# Patient Record
Sex: FEMALE | URBAN - NONMETROPOLITAN AREA
[De-identification: names, ages, dates, MRNs, and addresses within clinical notes are randomized per-mention and may not be internally consistent; named-entity substitution may affect disease eponyms.]

---

## 2017-08-14 ENCOUNTER — APPOINTMENT (RX ONLY)
Dept: URBAN - NONMETROPOLITAN AREA CLINIC 30 | Facility: CLINIC | Age: 62
Setting detail: DERMATOLOGY
End: 2017-08-14

## 2017-08-14 VITALS — HEART RATE: 89 BPM | DIASTOLIC BLOOD PRESSURE: 77 MMHG | SYSTOLIC BLOOD PRESSURE: 114 MMHG

## 2017-08-14 DIAGNOSIS — Z71.89 OTHER SPECIFIED COUNSELING: ICD-10-CM

## 2017-08-14 DIAGNOSIS — D22 MELANOCYTIC NEVI: ICD-10-CM

## 2017-08-14 DIAGNOSIS — L57.0 ACTINIC KERATOSIS: ICD-10-CM

## 2017-08-14 PROBLEM — L85.3 XEROSIS CUTIS: Status: ACTIVE | Noted: 2017-08-14

## 2017-08-14 PROBLEM — F32.9 MAJOR DEPRESSIVE DISORDER, SINGLE EPISODE, UNSPECIFIED: Status: ACTIVE | Noted: 2017-08-14

## 2017-08-14 PROBLEM — D22.71 MELANOCYTIC NEVI OF RIGHT LOWER LIMB, INCLUDING HIP: Status: ACTIVE | Noted: 2017-08-14

## 2017-08-14 PROBLEM — Z85.3 PERSONAL HISTORY OF MALIGNANT NEOPLASM OF BREAST: Status: ACTIVE | Noted: 2017-08-14

## 2017-08-14 PROBLEM — J30.1 ALLERGIC RHINITIS DUE TO POLLEN: Status: ACTIVE | Noted: 2017-08-14

## 2017-08-14 PROCEDURE — 17000 DESTRUCT PREMALG LESION: CPT

## 2017-08-14 PROCEDURE — ? SUNSCREEN RECOMMENDATIONS

## 2017-08-14 PROCEDURE — ? LIQUID NITROGEN

## 2017-08-14 PROCEDURE — 99201: CPT | Mod: 25

## 2017-08-14 PROCEDURE — ? COUNSELING

## 2017-08-14 ASSESSMENT — LOCATION ZONE DERM
LOCATION ZONE: NOSE
LOCATION ZONE: LEG

## 2017-08-14 ASSESSMENT — LOCATION DETAILED DESCRIPTION DERM
LOCATION DETAILED: RIGHT ANTERIOR PROXIMAL THIGH
LOCATION DETAILED: RIGHT NASAL SIDEWALL

## 2017-08-14 ASSESSMENT — LOCATION SIMPLE DESCRIPTION DERM
LOCATION SIMPLE: RIGHT NOSE
LOCATION SIMPLE: RIGHT THIGH

## 2022-04-19 ENCOUNTER — HOSPITAL ENCOUNTER (OUTPATIENT)
Dept: LAB | Facility: MEDICAL CENTER | Age: 67
End: 2022-04-19
Attending: PHYSICIAN ASSISTANT
Payer: MEDICARE

## 2022-04-19 ENCOUNTER — OFFICE VISIT (OUTPATIENT)
Dept: URGENT CARE | Facility: PHYSICIAN GROUP | Age: 67
End: 2022-04-19
Payer: MEDICARE

## 2022-04-19 ENCOUNTER — HOSPITAL ENCOUNTER (OUTPATIENT)
Dept: RADIOLOGY | Facility: MEDICAL CENTER | Age: 67
End: 2022-04-19
Attending: PHYSICIAN ASSISTANT
Payer: MEDICARE

## 2022-04-19 VITALS
HEIGHT: 68 IN | HEART RATE: 100 BPM | BODY MASS INDEX: 26.67 KG/M2 | RESPIRATION RATE: 16 BRPM | OXYGEN SATURATION: 91 % | TEMPERATURE: 98.5 F | DIASTOLIC BLOOD PRESSURE: 70 MMHG | SYSTOLIC BLOOD PRESSURE: 118 MMHG | WEIGHT: 176 LBS

## 2022-04-19 DIAGNOSIS — J98.6 ELEVATED HEMIDIAPHRAGM: ICD-10-CM

## 2022-04-19 DIAGNOSIS — R06.02 SOB (SHORTNESS OF BREATH): ICD-10-CM

## 2022-04-19 DIAGNOSIS — R10.13 DYSPEPSIA: ICD-10-CM

## 2022-04-19 DIAGNOSIS — R79.89 POSITIVE D-DIMER: ICD-10-CM

## 2022-04-19 LAB
ALBUMIN SERPL BCP-MCNC: 4.2 G/DL (ref 3.2–4.9)
ALBUMIN/GLOB SERPL: 1.3 G/DL
ALP SERPL-CCNC: 128 U/L (ref 30–99)
ALT SERPL-CCNC: 46 U/L (ref 2–50)
ANION GAP SERPL CALC-SCNC: 12 MMOL/L (ref 7–16)
APPEARANCE UR: NORMAL
AST SERPL-CCNC: 58 U/L (ref 12–45)
BASOPHILS # BLD AUTO: 0.4 % (ref 0–1.8)
BASOPHILS # BLD: 0.02 K/UL (ref 0–0.12)
BILIRUB SERPL-MCNC: 0.4 MG/DL (ref 0.1–1.5)
BILIRUB UR STRIP-MCNC: NORMAL MG/DL
BUN SERPL-MCNC: 14 MG/DL (ref 8–22)
CALCIUM SERPL-MCNC: 9.5 MG/DL (ref 8.5–10.5)
CHLORIDE SERPL-SCNC: 101 MMOL/L (ref 96–112)
CO2 SERPL-SCNC: 26 MMOL/L (ref 20–33)
COLOR UR AUTO: NORMAL
CREAT SERPL-MCNC: 0.49 MG/DL (ref 0.5–1.4)
D DIMER PPP IA.FEU-MCNC: 2.52 UG/ML (FEU) (ref 0–0.5)
EOSINOPHIL # BLD AUTO: 0.05 K/UL (ref 0–0.51)
EOSINOPHIL NFR BLD: 0.9 % (ref 0–6.9)
ERYTHROCYTE [DISTWIDTH] IN BLOOD BY AUTOMATED COUNT: 53.2 FL (ref 35.9–50)
FASTING STATUS PATIENT QL REPORTED: NORMAL
GFR SERPLBLD CREATININE-BSD FMLA CKD-EPI: 102 ML/MIN/1.73 M 2
GLOBULIN SER CALC-MCNC: 3.2 G/DL (ref 1.9–3.5)
GLUCOSE SERPL-MCNC: 79 MG/DL (ref 65–99)
GLUCOSE UR STRIP.AUTO-MCNC: NEGATIVE MG/DL
HCT VFR BLD AUTO: 48.9 % (ref 37–47)
HGB BLD-MCNC: 15.7 G/DL (ref 12–16)
IMM GRANULOCYTES # BLD AUTO: 0.01 K/UL (ref 0–0.11)
IMM GRANULOCYTES NFR BLD AUTO: 0.2 % (ref 0–0.9)
KETONES UR STRIP.AUTO-MCNC: NORMAL MG/DL
LEUKOCYTE ESTERASE UR QL STRIP.AUTO: NEGATIVE
LYMPHOCYTES # BLD AUTO: 0.9 K/UL (ref 1–4.8)
LYMPHOCYTES NFR BLD: 16.9 % (ref 22–41)
MCH RBC QN AUTO: 32.9 PG (ref 27–33)
MCHC RBC AUTO-ENTMCNC: 32.1 G/DL (ref 33.6–35)
MCV RBC AUTO: 102.5 FL (ref 81.4–97.8)
MONOCYTES # BLD AUTO: 0.4 K/UL (ref 0–0.85)
MONOCYTES NFR BLD AUTO: 7.5 % (ref 0–13.4)
NEUTROPHILS # BLD AUTO: 3.95 K/UL (ref 2–7.15)
NEUTROPHILS NFR BLD: 74.1 % (ref 44–72)
NITRITE UR QL STRIP.AUTO: NEGATIVE
NRBC # BLD AUTO: 0 K/UL
NRBC BLD-RTO: 0 /100 WBC
PH UR STRIP.AUTO: 5.5 [PH] (ref 5–8)
PLATELET # BLD AUTO: 334 K/UL (ref 164–446)
PMV BLD AUTO: 10.4 FL (ref 9–12.9)
POTASSIUM SERPL-SCNC: 4 MMOL/L (ref 3.6–5.5)
PROT SERPL-MCNC: 7.4 G/DL (ref 6–8.2)
PROT UR QL STRIP: NORMAL MG/DL
RBC # BLD AUTO: 4.77 M/UL (ref 4.2–5.4)
RBC UR QL AUTO: NEGATIVE
SODIUM SERPL-SCNC: 139 MMOL/L (ref 135–145)
SP GR UR STRIP.AUTO: 1.03
UROBILINOGEN UR STRIP-MCNC: 0.2 MG/DL
WBC # BLD AUTO: 5.3 K/UL (ref 4.8–10.8)

## 2022-04-19 PROCEDURE — 85025 COMPLETE CBC W/AUTO DIFF WBC: CPT

## 2022-04-19 PROCEDURE — 71046 X-RAY EXAM CHEST 2 VIEWS: CPT

## 2022-04-19 PROCEDURE — 36415 COLL VENOUS BLD VENIPUNCTURE: CPT

## 2022-04-19 PROCEDURE — 81002 URINALYSIS NONAUTO W/O SCOPE: CPT | Performed by: PHYSICIAN ASSISTANT

## 2022-04-19 PROCEDURE — 85379 FIBRIN DEGRADATION QUANT: CPT

## 2022-04-19 PROCEDURE — 99204 OFFICE O/P NEW MOD 45 MIN: CPT | Performed by: PHYSICIAN ASSISTANT

## 2022-04-19 PROCEDURE — 80053 COMPREHEN METABOLIC PANEL: CPT

## 2022-04-19 RX ORDER — OMEPRAZOLE 20 MG/1
20 CAPSULE, DELAYED RELEASE ORAL DAILY
COMMUNITY

## 2022-04-19 RX ORDER — MELOXICAM 7.5 MG/1
TABLET ORAL
COMMUNITY

## 2022-04-19 ASSESSMENT — ENCOUNTER SYMPTOMS
SORE THROAT: 0
VOMITING: 0
STRIDOR: 0
FEVER: 0
SINUS PAIN: 0
WHEEZING: 0
DIZZINESS: 0
SPUTUM PRODUCTION: 0
MYALGIAS: 0
COUGH: 0
DIAPHORESIS: 0
NAUSEA: 0
SHORTNESS OF BREATH: 1
DIARRHEA: 0
HEADACHES: 0
PALPITATIONS: 0
CHILLS: 0
ORTHOPNEA: 0
ABDOMINAL PAIN: 0
HEARTBURN: 1

## 2022-04-19 NOTE — PROGRESS NOTES
Subjective:     CHIEF COMPLAINT  Chief Complaint   Patient presents with   • Abdominal Pain     Pt states she has been having indigestion problems for about a month and was seen with her PCP and was told that they were concern of a gallbladder and states that she has been feeling really short of breath x 1 week       HPI  Dominga MARCANO is a 67 y.o. female who presents to the clinic with progressively worsening shortness of breath x1 to 2 weeks.  Patient has a past medical history significant for breast cancer in remission, tobacco use in which she quit in 2014 and GERD.  Patient lives in [place].  She is visiting from out of town to help her daughter move.  States she is becoming short of breath with minimal exertion.  Occasionally has a mild cough.  States she does not feel ill.  Denies any chest pain or pressure.  She does occasionally have pain pain to the right shoulder.  Denies any acute trauma or injury.  She is also burping more frequently.  Feels bloated to her abdominal region.  History of GERD for which she used to take Prilosec however is not been taking this recently.  Few records are available on the patient however she was seen by her PCP in [place] on 4/6/2022.  At that time she had an abdominal x-ray performed that was within normal limits.  Blood work was within normal limits.  Her chest x-ray demonstrated a small right-sided pleural effusion with no known etiology.  She denies any history of DVT or PE.  No history of cardiopulmonary disease.    REVIEW OF SYSTEMS  Review of Systems   Constitutional: Negative for chills, diaphoresis, fever and malaise/fatigue.   HENT: Negative for congestion, ear pain, sinus pain and sore throat.    Respiratory: Positive for shortness of breath. Negative for cough, sputum production, wheezing and stridor.    Cardiovascular: Negative for chest pain, palpitations, orthopnea and leg swelling.   Gastrointestinal: Positive for heartburn. Negative for abdominal pain,  "diarrhea, nausea and vomiting.   Musculoskeletal: Negative for myalgias.   Neurological: Negative for dizziness and headaches.       PAST MEDICAL HISTORY  There are no problems to display for this patient.      SURGICAL HISTORY  patient denies any surgical history    ALLERGIES  No Known Allergies    CURRENT MEDICATIONS  Home Medications     Reviewed by Honorio Singh P.A.-C. (Physician Assistant) on 04/19/22 at 0908  Med List Status: <None>   Medication Last Dose Status   meloxicam (MOBIC) 7.5 MG Tab PRN Active   omeprazole (PRILOSEC) 20 MG delayed-release capsule Taking Active                SOCIAL HISTORY  Social History     Tobacco Use   • Smoking status: Former Smoker   • Smokeless tobacco: Never Used   Substance and Sexual Activity   • Alcohol use: Not on file   • Drug use: Not on file   • Sexual activity: Not on file       FAMILY HISTORY  History reviewed. No pertinent family history.       Objective:     VITAL SIGNS: /70 (BP Location: Left arm, Patient Position: Sitting, BP Cuff Size: Large adult)   Pulse 100   Temp 36.9 °C (98.5 °F) (Temporal)   Resp 16   Ht 1.727 m (5' 8\")   Wt 79.8 kg (176 lb)   SpO2 91%   BMI 26.76 kg/m²     PHYSICAL EXAM  Physical Exam  Constitutional:       General: She is not in acute distress.     Appearance: Normal appearance. She is not ill-appearing, toxic-appearing or diaphoretic.   HENT:      Head: Normocephalic and atraumatic.      Right Ear: Tympanic membrane, ear canal and external ear normal.      Left Ear: Tympanic membrane, ear canal and external ear normal.      Nose: Nose normal. No congestion or rhinorrhea.      Mouth/Throat:      Mouth: Mucous membranes are moist.      Pharynx: No oropharyngeal exudate or posterior oropharyngeal erythema.   Eyes:      Conjunctiva/sclera: Conjunctivae normal.   Cardiovascular:      Rate and Rhythm: Normal rate and regular rhythm.      Pulses: Normal pulses.      Heart sounds: Normal heart sounds.   Pulmonary:      Effort: " Pulmonary effort is normal.      Breath sounds: Normal breath sounds. No wheezing.      Comments: Slightly decreased lung sounds to the right lower lobe.  No wheezes, rhonchi or rales.  Patient appears short of breath when speaking in full sentences.  Abdominal:      General: Bowel sounds are normal.      Tenderness: There is no abdominal tenderness. There is no guarding or rebound.      Comments: Patient has no abdominal tenderness to palpation.  No rebound, rigidity or guarding.  Negative Escobedo sign.   Musculoskeletal:      Cervical back: Normal range of motion. No muscular tenderness.   Lymphadenopathy:      Cervical: No cervical adenopathy.   Skin:     General: Skin is warm and dry.      Capillary Refill: Capillary refill takes less than 2 seconds.   Neurological:      Mental Status: She is alert.   Psychiatric:         Mood and Affect: Mood normal.         Thought Content: Thought content normal.       Lab Results/POC Test Results   Results for orders placed or performed in visit on 04/19/22   POCT Urinalysis   Result Value Ref Range    POC Color Dark Yellow Negative    POC Appearance Cloudy Negative    POC Leukocyte Esterase Negative Negative    POC Nitrites NEgative Negative    POC Urobiligen 0.2 Negative (0.2) mg/dL    POC Protein Trace Negative mg/dL    POC Urine PH 5.5 5.0 - 8.0    POC Blood Negative Negative    POC Specific Gravity 1.030 <1.005 - >1.030    POC Ketones Trace Negative mg/dL    POC Bilirubin Small Negative mg/dL    POC Glucose Negative Negative mg/dL         RADIOLOGY RESULTS   DX-CHEST-2 VIEWS    Result Date: 4/19/2022 4/19/2022 9:42 AM HISTORY/REASON FOR EXAM:  Shortness of Breath TECHNIQUE/EXAM DESCRIPTION AND NUMBER OF VIEWS: Two views of the chest. COMPARISON:  None. FINDINGS: Mild elevation of the right hemidiaphragm. No pulmonary infiltrates or consolidations are noted. No pleural effusions, no pneumothorax are appreciated. Normal cardiopericardial silhouette.     1. No active  cardiopulmonary abnormalities are identified.           Assessment/Plan:     1. SOB (shortness of breath)    - DX-CHEST-2 VIEWS; Future  - CBC WITH DIFFERENTIAL; Future  - Comp Metabolic Panel; Future  - D-DIMER; Future    2. Dyspepsia    - POCT Urinalysis  - CBC WITH DIFFERENTIAL; Future  - Comp Metabolic Panel; Future    3. Elevated hemidiaphragm    - D-DIMER; Future      MDM/Comments:    Patient presenting with shortness of breath x1 to 2 weeks.  On exam lung sounds are slightly decreased to the right lower quadrant.  Chest x-ray was performed that demonstrated a mildly elevated right hemidiaphragm.  No signs concerning for effusion, consolidation or pneumonia appreciated.  Patient's vital signs are stable and reassuring.  Recently traveled from [place] and is in town for 1 week.  Denies any prior history of cardiopulmonary disease.  Wells score: 0.  No lower extremity edema.  Patient states she does not feel ill.  No tenderness to palpation over the right upper quadrant.  At this time we will perform outpatient lab work and I will follow-up once this is available.  Strict ER precautions for any red flag symptoms.    Differential diagnosis, natural history, supportive care, and indications for immediate follow-up discussed. All questions answered. Patient agrees with the plan of care.    Follow-up as needed if symptoms worsen or fail to improve to PCP, Urgent care or Emergency Room.    I have personally reviewed prior external notes and test results pertinent to today's visit.  I have independently reviewed and interpreted all diagnostics ordered during this urgent care acute visit.   Discussed management options (risks,benefits, and alternatives to treatment). Pt expresses understanding and the treatment plan was agreed upon. Questions were encouraged and answered to pt's satisfaction.    Please note that this dictation was created using voice recognition software. I have made a reasonable attempt to correct  obvious errors, but I expect that there are errors of grammar and possibly content that I did not discover before finalizing the note.

## 2022-04-20 ENCOUNTER — TELEPHONE (OUTPATIENT)
Dept: URGENT CARE | Facility: PHYSICIAN GROUP | Age: 67
End: 2022-04-20

## 2022-04-21 ENCOUNTER — APPOINTMENT (OUTPATIENT)
Dept: RADIOLOGY | Facility: MEDICAL CENTER | Age: 67
End: 2022-04-21
Payer: MEDICARE

## 2022-04-21 ENCOUNTER — HOSPITAL ENCOUNTER (OUTPATIENT)
Facility: MEDICAL CENTER | Age: 67
End: 2022-04-22
Attending: STUDENT IN AN ORGANIZED HEALTH CARE EDUCATION/TRAINING PROGRAM | Admitting: INTERNAL MEDICINE
Payer: MEDICARE

## 2022-04-21 ENCOUNTER — APPOINTMENT (OUTPATIENT)
Dept: RADIOLOGY | Facility: MEDICAL CENTER | Age: 67
End: 2022-04-21
Attending: STUDENT IN AN ORGANIZED HEALTH CARE EDUCATION/TRAINING PROGRAM
Payer: MEDICARE

## 2022-04-21 ENCOUNTER — PATIENT MESSAGE (OUTPATIENT)
Dept: URGENT CARE | Facility: PHYSICIAN GROUP | Age: 67
End: 2022-04-21
Payer: MEDICARE

## 2022-04-21 DIAGNOSIS — R09.02 HYPOXIA: ICD-10-CM

## 2022-04-21 DIAGNOSIS — J90 PLEURAL EFFUSION ON RIGHT: ICD-10-CM

## 2022-04-21 DIAGNOSIS — R06.09 DYSPNEA ON EXERTION: ICD-10-CM

## 2022-04-21 LAB
ALBUMIN SERPL BCP-MCNC: 4.2 G/DL (ref 3.2–4.9)
ALBUMIN/GLOB SERPL: 1.2 G/DL
ALP SERPL-CCNC: 143 U/L (ref 30–99)
ALT SERPL-CCNC: 40 U/L (ref 2–50)
ANION GAP SERPL CALC-SCNC: 12 MMOL/L (ref 7–16)
AST SERPL-CCNC: 53 U/L (ref 12–45)
BASOPHILS # BLD AUTO: 0.5 % (ref 0–1.8)
BASOPHILS # BLD: 0.03 K/UL (ref 0–0.12)
BILIRUB SERPL-MCNC: 0.5 MG/DL (ref 0.1–1.5)
BUN SERPL-MCNC: 11 MG/DL (ref 8–22)
CALCIUM SERPL-MCNC: 9.6 MG/DL (ref 8.4–10.2)
CHLORIDE SERPL-SCNC: 98 MMOL/L (ref 96–112)
CO2 SERPL-SCNC: 26 MMOL/L (ref 20–33)
CREAT SERPL-MCNC: 0.53 MG/DL (ref 0.5–1.4)
EKG IMPRESSION: NORMAL
EOSINOPHIL # BLD AUTO: 0.06 K/UL (ref 0–0.51)
EOSINOPHIL NFR BLD: 1 % (ref 0–6.9)
ERYTHROCYTE [DISTWIDTH] IN BLOOD BY AUTOMATED COUNT: 49.1 FL (ref 35.9–50)
GFR SERPLBLD CREATININE-BSD FMLA CKD-EPI: 101 ML/MIN/1.73 M 2
GLOBULIN SER CALC-MCNC: 3.6 G/DL (ref 1.9–3.5)
GLUCOSE SERPL-MCNC: 98 MG/DL (ref 65–99)
HCT VFR BLD AUTO: 49.3 % (ref 37–47)
HGB BLD-MCNC: 16.2 G/DL (ref 12–16)
IMM GRANULOCYTES # BLD AUTO: 0.03 K/UL (ref 0–0.11)
IMM GRANULOCYTES NFR BLD AUTO: 0.5 % (ref 0–0.9)
LYMPHOCYTES # BLD AUTO: 1.24 K/UL (ref 1–4.8)
LYMPHOCYTES NFR BLD: 20.9 % (ref 22–41)
MCH RBC QN AUTO: 32.4 PG (ref 27–33)
MCHC RBC AUTO-ENTMCNC: 32.9 G/DL (ref 33.6–35)
MCV RBC AUTO: 98.6 FL (ref 81.4–97.8)
MONOCYTES # BLD AUTO: 0.55 K/UL (ref 0–0.85)
MONOCYTES NFR BLD AUTO: 9.3 % (ref 0–13.4)
NEUTROPHILS # BLD AUTO: 4.01 K/UL (ref 2–7.15)
NEUTROPHILS NFR BLD: 67.8 % (ref 44–72)
NRBC # BLD AUTO: 0 K/UL
NRBC BLD-RTO: 0 /100 WBC
PLATELET # BLD AUTO: 322 K/UL (ref 164–446)
PMV BLD AUTO: 9.6 FL (ref 9–12.9)
POTASSIUM SERPL-SCNC: 3.7 MMOL/L (ref 3.6–5.5)
PROT SERPL-MCNC: 7.8 G/DL (ref 6–8.2)
RBC # BLD AUTO: 5 M/UL (ref 4.2–5.4)
SODIUM SERPL-SCNC: 136 MMOL/L (ref 135–145)
TROPONIN T SERPL-MCNC: 6 NG/L (ref 6–19)
WBC # BLD AUTO: 5.9 K/UL (ref 4.8–10.8)

## 2022-04-21 PROCEDURE — 85025 COMPLETE CBC W/AUTO DIFF WBC: CPT

## 2022-04-21 PROCEDURE — 84484 ASSAY OF TROPONIN QUANT: CPT

## 2022-04-21 PROCEDURE — 80053 COMPREHEN METABOLIC PANEL: CPT

## 2022-04-21 PROCEDURE — 99285 EMERGENCY DEPT VISIT HI MDM: CPT

## 2022-04-21 PROCEDURE — 700117 HCHG RX CONTRAST REV CODE 255: Performed by: STUDENT IN AN ORGANIZED HEALTH CARE EDUCATION/TRAINING PROGRAM

## 2022-04-21 PROCEDURE — G0378 HOSPITAL OBSERVATION PER HR: HCPCS

## 2022-04-21 PROCEDURE — 71275 CT ANGIOGRAPHY CHEST: CPT | Mod: ME

## 2022-04-21 PROCEDURE — 93005 ELECTROCARDIOGRAM TRACING: CPT | Performed by: STUDENT IN AN ORGANIZED HEALTH CARE EDUCATION/TRAINING PROGRAM

## 2022-04-21 PROCEDURE — 99220 PR INITIAL OBSERVATION CARE,LEVL III: CPT | Performed by: INTERNAL MEDICINE

## 2022-04-21 PROCEDURE — 93005 ELECTROCARDIOGRAM TRACING: CPT

## 2022-04-21 PROCEDURE — 36415 COLL VENOUS BLD VENIPUNCTURE: CPT

## 2022-04-21 RX ORDER — OXYCODONE HYDROCHLORIDE 5 MG/1
5 TABLET ORAL
Status: DISCONTINUED | OUTPATIENT
Start: 2022-04-21 | End: 2022-04-22 | Stop reason: HOSPADM

## 2022-04-21 RX ORDER — POTASSIUM CHLORIDE 20 MEQ/1
20 TABLET, EXTENDED RELEASE ORAL 2 TIMES DAILY
Status: DISCONTINUED | OUTPATIENT
Start: 2022-04-22 | End: 2022-04-22 | Stop reason: HOSPADM

## 2022-04-21 RX ORDER — BISACODYL 10 MG
10 SUPPOSITORY, RECTAL RECTAL
Status: DISCONTINUED | OUTPATIENT
Start: 2022-04-21 | End: 2022-04-22 | Stop reason: HOSPADM

## 2022-04-21 RX ORDER — AMOXICILLIN 250 MG
2 CAPSULE ORAL 2 TIMES DAILY
Status: DISCONTINUED | OUTPATIENT
Start: 2022-04-22 | End: 2022-04-22 | Stop reason: HOSPADM

## 2022-04-21 RX ORDER — ONDANSETRON 4 MG/1
4 TABLET, ORALLY DISINTEGRATING ORAL EVERY 4 HOURS PRN
Status: DISCONTINUED | OUTPATIENT
Start: 2022-04-21 | End: 2022-04-22 | Stop reason: HOSPADM

## 2022-04-21 RX ORDER — CYCLOBENZAPRINE HCL 10 MG
10 TABLET ORAL 3 TIMES DAILY PRN
Status: DISCONTINUED | OUTPATIENT
Start: 2022-04-21 | End: 2022-04-22 | Stop reason: HOSPADM

## 2022-04-21 RX ORDER — ONDANSETRON 2 MG/ML
4 INJECTION INTRAMUSCULAR; INTRAVENOUS EVERY 4 HOURS PRN
Status: DISCONTINUED | OUTPATIENT
Start: 2022-04-21 | End: 2022-04-22 | Stop reason: HOSPADM

## 2022-04-21 RX ORDER — MORPHINE SULFATE 4 MG/ML
2 INJECTION INTRAVENOUS
Status: DISCONTINUED | OUTPATIENT
Start: 2022-04-21 | End: 2022-04-22 | Stop reason: HOSPADM

## 2022-04-21 RX ORDER — POLYETHYLENE GLYCOL 3350 17 G/17G
1 POWDER, FOR SOLUTION ORAL
Status: DISCONTINUED | OUTPATIENT
Start: 2022-04-21 | End: 2022-04-22 | Stop reason: HOSPADM

## 2022-04-21 RX ORDER — IBUPROFEN 600 MG/1
600 TABLET ORAL EVERY 6 HOURS PRN
Status: DISCONTINUED | OUTPATIENT
Start: 2022-04-21 | End: 2022-04-22 | Stop reason: HOSPADM

## 2022-04-21 RX ORDER — HEPARIN SODIUM 5000 [USP'U]/ML
5000 INJECTION, SOLUTION INTRAVENOUS; SUBCUTANEOUS EVERY 8 HOURS
Status: DISCONTINUED | OUTPATIENT
Start: 2022-04-22 | End: 2022-04-21

## 2022-04-21 RX ORDER — ACETAMINOPHEN 325 MG/1
650 TABLET ORAL EVERY 6 HOURS PRN
Status: DISCONTINUED | OUTPATIENT
Start: 2022-04-21 | End: 2022-04-22 | Stop reason: HOSPADM

## 2022-04-21 RX ORDER — LABETALOL HYDROCHLORIDE 5 MG/ML
10 INJECTION, SOLUTION INTRAVENOUS EVERY 4 HOURS PRN
Status: DISCONTINUED | OUTPATIENT
Start: 2022-04-21 | End: 2022-04-22 | Stop reason: HOSPADM

## 2022-04-21 RX ORDER — OXYCODONE HYDROCHLORIDE 5 MG/1
2.5 TABLET ORAL
Status: DISCONTINUED | OUTPATIENT
Start: 2022-04-21 | End: 2022-04-22 | Stop reason: HOSPADM

## 2022-04-21 RX ORDER — FUROSEMIDE 10 MG/ML
20 INJECTION INTRAMUSCULAR; INTRAVENOUS
Status: DISCONTINUED | OUTPATIENT
Start: 2022-04-22 | End: 2022-04-22 | Stop reason: HOSPADM

## 2022-04-21 RX ORDER — OMEPRAZOLE 20 MG/1
20 CAPSULE, DELAYED RELEASE ORAL DAILY
Status: DISCONTINUED | OUTPATIENT
Start: 2022-04-22 | End: 2022-04-22 | Stop reason: HOSPADM

## 2022-04-21 RX ADMIN — IOHEXOL 56 ML: 350 INJECTION, SOLUTION INTRAVENOUS at 21:42

## 2022-04-21 ASSESSMENT — ENCOUNTER SYMPTOMS
DIARRHEA: 0
STRIDOR: 0
CHILLS: 0
ABDOMINAL PAIN: 0
NAUSEA: 0
TINGLING: 0
VOMITING: 0
PALPITATIONS: 0
SHORTNESS OF BREATH: 1
COUGH: 1
LOSS OF CONSCIOUSNESS: 0
FALLS: 0
WEAKNESS: 0
MYALGIAS: 0
FEVER: 0
BACK PAIN: 1
DIZZINESS: 0
CONSTIPATION: 0
DEPRESSION: 0
HEADACHES: 0
SPUTUM PRODUCTION: 0

## 2022-04-21 ASSESSMENT — FIBROSIS 4 INDEX: FIB4 SCORE: 1.74

## 2022-04-22 ENCOUNTER — APPOINTMENT (OUTPATIENT)
Dept: RADIOLOGY | Facility: MEDICAL CENTER | Age: 67
End: 2022-04-22
Attending: INTERNAL MEDICINE
Payer: MEDICARE

## 2022-04-22 ENCOUNTER — HOSPITAL ENCOUNTER (OUTPATIENT)
Dept: CARDIOLOGY | Facility: MEDICAL CENTER | Age: 67
End: 2022-04-22
Attending: INTERNAL MEDICINE | Admitting: INTERNAL MEDICINE
Payer: MEDICARE

## 2022-04-22 VITALS
HEIGHT: 68 IN | HEART RATE: 86 BPM | OXYGEN SATURATION: 90 % | TEMPERATURE: 98 F | RESPIRATION RATE: 16 BRPM | WEIGHT: 178.57 LBS | DIASTOLIC BLOOD PRESSURE: 70 MMHG | BODY MASS INDEX: 27.06 KG/M2 | SYSTOLIC BLOOD PRESSURE: 120 MMHG

## 2022-04-22 PROBLEM — M54.9 BACK PAIN: Status: ACTIVE | Noted: 2022-04-22

## 2022-04-22 PROBLEM — K76.9 LIVER LESION: Status: ACTIVE | Noted: 2022-04-22

## 2022-04-22 PROBLEM — R09.02 HYPOXIA: Status: ACTIVE | Noted: 2022-04-22

## 2022-04-22 PROBLEM — R79.89 ELEVATED D-DIMER: Status: ACTIVE | Noted: 2022-04-22

## 2022-04-22 LAB
ANION GAP SERPL CALC-SCNC: 10 MMOL/L (ref 7–16)
APPEARANCE FLD: NORMAL
APTT PPP: 25.5 SEC (ref 24.7–36)
BODY FLD TYPE: NORMAL
BODY FLD TYPE: NORMAL
BUN SERPL-MCNC: 10 MG/DL (ref 8–22)
CALCIUM SERPL-MCNC: 8.9 MG/DL (ref 8.4–10.2)
CHLORIDE SERPL-SCNC: 101 MMOL/L (ref 96–112)
CO2 SERPL-SCNC: 26 MMOL/L (ref 20–33)
COLOR FLD: YELLOW
CREAT SERPL-MCNC: 0.46 MG/DL (ref 0.5–1.4)
CSF COMMENTS 1658: NORMAL
CYTOLOGY REG CYTOL: NORMAL
ERYTHROCYTE [DISTWIDTH] IN BLOOD BY AUTOMATED COUNT: 49.7 FL (ref 35.9–50)
GFR SERPLBLD CREATININE-BSD FMLA CKD-EPI: 104 ML/MIN/1.73 M 2
GLUCOSE FLD-MCNC: 98 MG/DL
GLUCOSE SERPL-MCNC: 107 MG/DL (ref 65–99)
HCT VFR BLD AUTO: 43.4 % (ref 37–47)
HGB BLD-MCNC: 14.3 G/DL (ref 12–16)
INR PPP: 0.91 (ref 0.87–1.13)
LDH FLD L TO P-CCNC: 152 U/L
LYMPHOCYTES NFR FLD: 80 %
MCH RBC QN AUTO: 33 PG (ref 27–33)
MCHC RBC AUTO-ENTMCNC: 32.9 G/DL (ref 33.6–35)
MCV RBC AUTO: 100.2 FL (ref 81.4–97.8)
MESOTHL CELL NFR FLD: 5 %
MONONUC CELLS NFR FLD: 14 %
NEUTROPHILS NFR FLD: 1 %
PH FLD: 8 [PH]
PLATELET # BLD AUTO: 275 K/UL (ref 164–446)
PMV BLD AUTO: 9.6 FL (ref 9–12.9)
POTASSIUM SERPL-SCNC: 3.7 MMOL/L (ref 3.6–5.5)
PROT FLD-MCNC: 4.2 G/DL
PROTHROMBIN TIME: 11.6 SEC (ref 12–14.6)
RBC # BLD AUTO: 4.33 M/UL (ref 4.2–5.4)
RBC # FLD: 5000 CELLS/UL
SODIUM SERPL-SCNC: 137 MMOL/L (ref 135–145)
WBC # BLD AUTO: 4.9 K/UL (ref 4.8–10.8)
WBC # FLD: 2595 CELLS/UL

## 2022-04-22 PROCEDURE — 80048 BASIC METABOLIC PNL TOTAL CA: CPT

## 2022-04-22 PROCEDURE — 85730 THROMBOPLASTIN TIME PARTIAL: CPT

## 2022-04-22 PROCEDURE — 700111 HCHG RX REV CODE 636 W/ 250 OVERRIDE (IP): Performed by: INTERNAL MEDICINE

## 2022-04-22 PROCEDURE — 82150 ASSAY OF AMYLASE: CPT

## 2022-04-22 PROCEDURE — A9270 NON-COVERED ITEM OR SERVICE: HCPCS | Performed by: INTERNAL MEDICINE

## 2022-04-22 PROCEDURE — 94760 N-INVAS EAR/PLS OXIMETRY 1: CPT

## 2022-04-22 PROCEDURE — 88112 CYTOPATH CELL ENHANCE TECH: CPT

## 2022-04-22 PROCEDURE — 83615 LACTATE (LD) (LDH) ENZYME: CPT

## 2022-04-22 PROCEDURE — 88342 IMHCHEM/IMCYTCHM 1ST ANTB: CPT

## 2022-04-22 PROCEDURE — G0378 HOSPITAL OBSERVATION PER HR: HCPCS

## 2022-04-22 PROCEDURE — 83986 ASSAY PH BODY FLUID NOS: CPT

## 2022-04-22 PROCEDURE — 84157 ASSAY OF PROTEIN OTHER: CPT

## 2022-04-22 PROCEDURE — 71045 X-RAY EXAM CHEST 1 VIEW: CPT

## 2022-04-22 PROCEDURE — 88305 TISSUE EXAM BY PATHOLOGIST: CPT

## 2022-04-22 PROCEDURE — 87205 SMEAR GRAM STAIN: CPT | Mod: 91

## 2022-04-22 PROCEDURE — 700102 HCHG RX REV CODE 250 W/ 637 OVERRIDE(OP): Performed by: INTERNAL MEDICINE

## 2022-04-22 PROCEDURE — 700101 HCHG RX REV CODE 250: Performed by: INTERNAL MEDICINE

## 2022-04-22 PROCEDURE — 87116 MYCOBACTERIA CULTURE: CPT

## 2022-04-22 PROCEDURE — 96374 THER/PROPH/DIAG INJ IV PUSH: CPT

## 2022-04-22 PROCEDURE — 32554 ASPIRATE PLEURA W/O IMAGING: CPT

## 2022-04-22 PROCEDURE — 99217 PR OBSERVATION CARE DISCHARGE: CPT | Performed by: HOSPITALIST

## 2022-04-22 PROCEDURE — 82945 GLUCOSE OTHER FLUID: CPT

## 2022-04-22 PROCEDURE — 85610 PROTHROMBIN TIME: CPT

## 2022-04-22 PROCEDURE — 85027 COMPLETE CBC AUTOMATED: CPT

## 2022-04-22 PROCEDURE — 32555 ASPIRATE PLEURA W/ IMAGING: CPT | Mod: RT | Performed by: INTERNAL MEDICINE

## 2022-04-22 PROCEDURE — 87102 FUNGUS ISOLATION CULTURE: CPT

## 2022-04-22 PROCEDURE — 87070 CULTURE OTHR SPECIMN AEROBIC: CPT

## 2022-04-22 PROCEDURE — 89051 BODY FLUID CELL COUNT: CPT

## 2022-04-22 PROCEDURE — 88341 IMHCHEM/IMCYTCHM EA ADD ANTB: CPT | Mod: 91

## 2022-04-22 RX ORDER — LIDOCAINE HYDROCHLORIDE 20 MG/ML
20 INJECTION, SOLUTION INFILTRATION; PERINEURAL ONCE
Status: COMPLETED | OUTPATIENT
Start: 2022-04-22 | End: 2022-04-22

## 2022-04-22 RX ADMIN — LIDOCAINE HYDROCHLORIDE 20 ML: 20 INJECTION, SOLUTION INFILTRATION; PERINEURAL at 12:00

## 2022-04-22 RX ADMIN — SENNOSIDES AND DOCUSATE SODIUM 2 TABLET: 50; 8.6 TABLET ORAL at 05:21

## 2022-04-22 RX ADMIN — OMEPRAZOLE 20 MG: 20 CAPSULE, DELAYED RELEASE ORAL at 05:22

## 2022-04-22 RX ADMIN — FUROSEMIDE 20 MG: 10 INJECTION, SOLUTION INTRAMUSCULAR; INTRAVENOUS at 05:21

## 2022-04-22 RX ADMIN — ACETAMINOPHEN 650 MG: 325 TABLET, FILM COATED ORAL at 13:23

## 2022-04-22 RX ADMIN — POTASSIUM CHLORIDE 20 MEQ: 20 TABLET, EXTENDED RELEASE ORAL at 05:22

## 2022-04-22 RX ADMIN — IBUPROFEN 600 MG: 600 TABLET ORAL at 13:23

## 2022-04-22 ASSESSMENT — LIFESTYLE VARIABLES
TOTAL SCORE: 0
HOW MANY TIMES IN THE PAST YEAR HAVE YOU HAD 5 OR MORE DRINKS IN A DAY: 0
HAVE PEOPLE ANNOYED YOU BY CRITICIZING YOUR DRINKING: NO
EVER FELT BAD OR GUILTY ABOUT YOUR DRINKING: NO
CONSUMPTION TOTAL: NEGATIVE
AVERAGE NUMBER OF DAYS PER WEEK YOU HAVE A DRINK CONTAINING ALCOHOL: 6
ON A TYPICAL DAY WHEN YOU DRINK ALCOHOL HOW MANY DRINKS DO YOU HAVE: 1
ALCOHOL_USE: YES
TOTAL SCORE: 0
EVER HAD A DRINK FIRST THING IN THE MORNING TO STEADY YOUR NERVES TO GET RID OF A HANGOVER: NO
TOTAL SCORE: 0
HAVE YOU EVER FELT YOU SHOULD CUT DOWN ON YOUR DRINKING: NO

## 2022-04-22 ASSESSMENT — COGNITIVE AND FUNCTIONAL STATUS - GENERAL
DAILY ACTIVITIY SCORE: 24
SUGGESTED CMS G CODE MODIFIER MOBILITY: CH
MOBILITY SCORE: 24
SUGGESTED CMS G CODE MODIFIER DAILY ACTIVITY: CH

## 2022-04-22 ASSESSMENT — FIBROSIS 4 INDEX: FIB4 SCORE: 2.07

## 2022-04-22 ASSESSMENT — PATIENT HEALTH QUESTIONNAIRE - PHQ9
2. FEELING DOWN, DEPRESSED, IRRITABLE, OR HOPELESS: NOT AT ALL
SUM OF ALL RESPONSES TO PHQ9 QUESTIONS 1 AND 2: 0
1. LITTLE INTEREST OR PLEASURE IN DOING THINGS: NOT AT ALL

## 2022-04-22 NOTE — DISCHARGE SUMMARY
Discharge Summary    CHIEF COMPLAINT ON ADMISSION  Chief Complaint   Patient presents with   • Shortness of Breath   • Shoulder Pain     Patient report of shortness of breath for approximately 1.5 week progressively getting worst. Right shoulder and right upper back pain for the past 1 month.  Patient had blood drawn and her D-Dimer was elevated, she was sent for further evaluation for possible PE study.        Reason for Admission  Abnormal Labs     Admission Date  4/21/2022    CODE STATUS  Full Code    HPI & HOSPITAL COURSE  Ms. Dominag Morgan is a 67-year-old female who has a past medical history of breast cancer on the right side status post surgical resection and chemotherapy in 2014.  Now came from [place] to visit her daughter.  He had the patient became more short of breath and started to experience some back pain as well.  She says the situation already began back in [place] and her primary care physician was aware of it and ordered some initial imaging studies including a chest x-ray as well as an ultrasound and some lab work.  However since the situation became worse the patient thus came to the emergency room for evaluation.  Here initial chest x-ray shows mild effusion but then the patient underwent a CAT scan which shows significant effusion of the right lung.  Minimal effusion on the left lung.  The patient also has some abnormalities on her liver.  The patient has a longstanding history of tobacco use and certainly is at high risk for possible lung malignancy.  This at this point cannot be excluded but is suspected.  After discussion with my partner on the time of admission the patient decided she would does not want a full work-up here she would like to go back to [place] to complete her work-up but would like the pleural effusion addressed so that she has an easier time breathing.  I sat with her and the daughter at bedside today and extensively discussed the plan of treatment that is necessary for a  complete work-up and possible treatment.  The patient and at this point elected to call her primary care physician in [place] and at this point is beginning her work-up down there starting next week on Thursday when she returns to [place].  In the meantime we have drained her pleural effusion here.  780 mL of fluid were removed.  Post procedure chest x-ray was performed.  Patient's pain currently is controlled and after the procedure the patient is breathing better.  Patient at this point can discharge home and complete outpatient work-up for her situation.    Therefore, she is discharged in good and stable condition to home with close outpatient follow-up.    The patient recovered much more quickly than anticipated on admission.    Discharge Date  4/22/2022    FOLLOW UP ITEMS POST DISCHARGE  Follow-up with the primary care physician in [place] next week    DISCHARGE DIAGNOSES  Principal Problem:    Bilateral pleural effusion POA: Yes  Active Problems:    Liver lesion POA: Yes    Hypoxia POA: Yes    Back pain POA: Yes    Elevated d-dimer POA: Yes  Resolved Problems:    * No resolved hospital problems. *      FOLLOW UP  No future appointments.  No follow-up provider specified.    MEDICATIONS ON DISCHARGE     Medication List      CONTINUE taking these medications      Instructions   meloxicam 7.5 MG Tabs  Commonly known as: MOBIC   meloxicam 7.5 mg tablet     omeprazole 20 MG delayed-release capsule  Commonly known as: PRILOSEC   Take 20 mg by mouth every day.  Dose: 20 mg            Allergies  No Known Allergies    DIET  Orders Placed This Encounter   Procedures   • Diet NPO Restrict to: Sips with Medications     Standing Status:   Standing     Number of Occurrences:   1     Order Specific Question:   Diet NPO Restrict to:     Answer:   Sips with Medications [3]       ACTIVITY  As tolerated.  Weight bearing as tolerated    CONSULTATIONS  Pulmonology    PROCEDURES  Right side thoracentesis yielding 780 mL of  fluid    LABORATORY  Lab Results   Component Value Date    SODIUM 137 04/22/2022    POTASSIUM 3.7 04/22/2022    CHLORIDE 101 04/22/2022    CO2 26 04/22/2022    GLUCOSE 107 (H) 04/22/2022    BUN 10 04/22/2022    CREATININE 0.46 (L) 04/22/2022        Lab Results   Component Value Date    WBC 4.9 04/22/2022    HEMOGLOBIN 14.3 04/22/2022    HEMATOCRIT 43.4 04/22/2022    PLATELETCT 275 04/22/2022        Total time of the discharge process exceeds 32 minutes.

## 2022-04-22 NOTE — CARE PLAN
The patient is Stable - Low risk of patient condition declining or worsening         Progress made toward(s) clinical / shift goals:    Problem: Knowledge Deficit - Standard  Goal: Patient and family/care givers will demonstrate understanding of plan of care, disease process/condition, diagnostic tests and medications  Outcome: Progressing  Note: Updated patient on plan of care. Encouraged verbalization of questions and concerns. All concerns addressed at this time.     Problem: Communication  Goal: The ability to communicate needs accurately and effectively will improve  Outcome: Progressing  Note:   Patient and spouse updated on the plan of care. Encouraged to voice feelings and to ask questions. Answered all questions and concerns. Agrees with the plan of care.         Patient is not progressing towards the following goals:

## 2022-04-22 NOTE — ASSESSMENT & PLAN NOTE
- Patient will need a three-phase CT of the liver however she does want her work-up to be done in [place], she did just have contrast on hesitant to give her more contrast especially if a repeat CTA of the chest is needed  -I am concerned she has metastatic cancer, I am unsure if this is the primary or with her history of breast cancer if that is the primary however there was no breast mass noted on CT scan

## 2022-04-22 NOTE — PROCEDURES
Thoracentesis    Date/Time: 4/22/2022 12:45 PM  Performed by: Cassandra Angel M.D.  Authorized by: Cassandra Angel M.D.     Consent:     Consent obtained:  Written    Consent given by:  Patient    Risks, benefits, and alternatives were discussed: yes      Risks discussed:  Bleeding, infection, pneumothorax and incomplete drainage    Alternatives discussed:  No treatment  Universal protocol:     Procedure explained and questions answered to patient or proxy's satisfaction: yes      Relevant documents present and verified: yes      Test results available: yes      Imaging studies available: yes      Required blood products, implants, devices, and special equipment available: yes      Site/side marked: yes      Immediately prior to procedure, a time out was called: yes      Patient identity confirmed:  Verbally with patient  Sedation:     Sedation type:  None  Anesthesia:     Anesthesia method:  Local infiltration    Local anesthetic:  Lidocaine 2% w/o epi  Procedure details:     Patient position:  Sitting    Location:  R midscapular line    Intercostal space:  8th    Puncture method:  Over-the-needle catheter    Ultrasound guidance: yes      Indwelling catheter placed: yes      Needle gauge:  16    Catheter size:  6 Fr    Number of attempts:  1    Drainage characteristics:  Serosanguinous  Post-procedure details:     Chest x-ray performed: yes      Procedure completion:  Tolerated  Comments:      Cassandra Angel MD RD  Pulmonary and Critical Care    Available on [employer]

## 2022-04-22 NOTE — ASSESSMENT & PLAN NOTE
- I think this is likely due to cancer, I do not think she has a PE  -Certainly no central PE on CT of the chest however more distal vessels were not adequately seen  -I am not going to start full dose anticoagulation at this time, SCDs only since she will need a thoracentesis  -If no improvement in her oxygenation postthoracentesis on Lasix, could consider repeat of CTA of the chest however I think this is less likely

## 2022-04-22 NOTE — PROGRESS NOTES
Patient discharged home with daughter. Walked off unit with no needs. All paperwork gone over and explained. Minimal pain at thoracentesis site and instructed to take acetaminophen and ibuprofen for pain.

## 2022-04-22 NOTE — ASSESSMENT & PLAN NOTE
- This is near the right scapula, I did discuss this with the radiologist who states there is significant sclerotic bony lesion in this area, this is likely causing the pain  -Since it is intermittent in nature, potentially muscle spasm  -Start multiple pain meds to determine which will decrease her pain  -I do not think this is due to a pulmonary embolism, no worsening with a deep breath however she is at risk for a PE, if there is no improvement in her oxygenation after thoracentesis repeat CTA could be considered to get more distal vessels or full dose anticoagulation could be considered

## 2022-04-22 NOTE — ASSESSMENT & PLAN NOTE
- Right greater than left, I have ordered a thoracentesis on the right  -We will also start Lasix  -I do not think this is heart failure however I will obtain an echocardiogram so this is ruled out  -Patient wants her work-up primarily to be done when she gets back home, she just wants to feel better now and be safe to finish her vacation and go home next week  -I told her for now, we will do a thoracentesis and an echocardiogram, she should go home with her medical records and take them to her primary care provider  -I did express to her that with the liver lesion as well as the sclerotic bony lesions and history of breast cancer, I was concerned that she had cancer and does need follow-up, she and her daughter understood, I told her she should make an appointment with her primary care provider soon even prior to arriving home

## 2022-04-22 NOTE — DISCHARGE INSTRUCTIONS
Discharge Instructions    Discharged to home by car with relative. Discharged via walking, hospital escort: Refused.  Special equipment needed: Not Applicable    Be sure to schedule a follow-up appointment with your primary care doctor or any specialists as instructed.     Discharge Plan:        I understand that a diet low in cholesterol, fat, and sodium is recommended for good health. Unless I have been given specific instructions below for another diet, I accept this instruction as my diet prescription.       Special Instructions: None    · Is patient discharged on Warfarin / Coumadin?   No     Depression / Suicide Risk    As you are discharged from this [facility] facility, it is important to learn how to keep safe from harming yourself.    Recognize the warning signs:  · Abrupt changes in personality, positive or negative- including increase in energy   · Giving away possessions  · Change in eating patterns- significant weight changes-  positive or negative  · Change in sleeping patterns- unable to sleep or sleeping all the time   · Unwillingness or inability to communicate  · Depression  · Unusual sadness, discouragement and loneliness  · Talk of wanting to die  · Neglect of personal appearance   · Rebelliousness- reckless behavior  · Withdrawal from people/activities they love  · Confusion- inability to concentrate     If you or a loved one observes any of these behaviors or has concerns about self-harm, here's what you can do:  · Talk about it- your feelings and reasons for harming yourself  · Remove any means that you might use to hurt yourself (examples: pills, rope, extension cords, firearm)  · Get professional help from the community (Mental Health, Substance Abuse, psychological counseling)  · Do not be alone:Call your Safe Contact- someone whom you trust who will be there for you.  · Call your local CRISIS HOTLINE 423-4307 or 303-305-0420  · Call your local Children's Mobile Crisis Response Team  [facility] (726) 257-2887 or www.[employer]  · Call the toll free National Suicide Prevention Hotlines   · National Suicide Prevention Lifeline 446-951-RIMZ (2601)  · National Hope Line Network 800-SUICIDE (478-1002)    Discharge Instructions    Discharged to home by car with relative. Discharged via wheelchair, hospital escort: Yes.  Special equipment needed: Not Applicable    Be sure to schedule a follow-up appointment with your primary care doctor or any specialists as instructed.     Discharge Plan:        I understand that a diet low in cholesterol, fat, and sodium is recommended for good health. Unless I have been given specific instructions below for another diet, I accept this instruction as my diet prescription.   Other diet: Regular      Special Instructions: None    · Is patient discharged on Warfarin / Coumadin?   No       Pleural Effusion  Pleural effusion is an abnormal buildup of fluid in the layers of tissue between the lungs and the inside of the chest (pleural space) The two layers of tissue that line the lungs and the inside of the chest are called pleura. Usually, there is no air in the space between the pleura, only a thin layer of fluid. Some conditions can cause a large amount of fluid to build up, which can cause the lung to collapse if untreated. A pleural effusion is usually caused by another disease that requires treatment.  What are the causes?  Pleural effusion can be caused by:  · Heart failure.  · Certain infections, such as pneumonia or tuberculosis.  · Cancer.  · A blood clot in the lung (pulmonary embolism).  · Complications from surgery, such as from open heart surgery.  · Liver disease (cirrhosis).  · Kidney disease.  What are the signs or symptoms?  In some cases, pleural effusion may cause no symptoms. If symptoms are present, they may include:  · Shortness of breath, especially when lying down.  · Chest pain. This may get worse when taking a deep  breath.  · Fever.  · Dry, long-lasting (chronic) cough.  · Hiccups.  · Rapid breathing.  An underlying condition that is causing the pleural effusion (such as heart failure, pneumonia, blood clots, tuberculosis, or cancer) may also cause other symptoms.  How is this diagnosed?  This condition may be diagnosed based on:  · Your symptoms and medical history.  · A physical exam.  · A chest X-ray.  · A procedure to use a needle to remove fluid from the pleural space (thoracentesis). This fluid is tested.  · Other imaging studies of the chest, such as ultrasound or CT scan.  How is this treated?  Depending on the cause of your condition, treatment may include:  · Treating the underlying condition that is causing the effusion. When that condition improves, the effusion will also improve. Examples of treatment for underlying conditions include:  ? Antibiotic medicines to treat an infection.  ? Diuretics or other heart medicines to treat heart failure.  · Thoracentesis.  · Placing a thin flexible tube under your skin and into your chest to continuously drain the effusion (indwelling pleural catheter).  · Surgery to remove the outer layer of tissue from the pleural space (decortication).  · A procedure to put medicine into the chest cavity to seal the pleural space and prevent fluid buildup (pleurodesis).  · Chemotherapy and radiation therapy, if you have cancerous (malignant) pleural effusion. These treatments are typically used to treat cancer. They kill certain cells in the body.  Follow these instructions at home:  · Take over-the-counter and prescription medicines only as told by your health care provider.  · Ask your health care provider what activities are safe for you.  · Keep track of how long you are able to do mild exercise (such as walking) before you get short of breath. Write down this information to share with your health care provider. Your ability to exercise should improve over time.  · Do not use any  products that contain nicotine or tobacco, such as cigarettes and e-cigarettes. If you need help quitting, ask your health care provider.  · Keep all follow-up visits as told by your health care provider. This is important.  Contact a health care provider if:  · The amount of time that you are able to do mild exercise:  ? Decreases.  ? Does not improve with time.  · You have a fever.  Get help right away if:  · You are short of breath.  · You develop chest pain.  · You develop a new cough.  Summary  · Pleural effusion is an abnormal buildup of fluid in the layers of tissue between the lungs and the inside of the chest.  · Pleural effusion can have many causes, including heart failure, pulmonary embolism, infections, or cancer.  · Symptoms of pleural effusion can include shortness of breath, chest pain, fever, long-lasting (chronic) cough, hiccups, or rapid breathing.  · Diagnosis often involves making images of the chest (such as with ultrasound or X-ray) and removing fluid (thoracentesis) to send for testing.  · Treatment for pleural effusion depends on what underlying condition is causing it.  This information is not intended to replace advice given to you by your health care provider. Make sure you discuss any questions you have with your health care provider.  Document Released: 12/18/2006 Document Revised: 11/30/2018 Document Reviewed: 08/23/2018  [employer] Patient Education © 2020 [employer]      Thoracentesis, Care After  This sheet gives you information about how to care for yourself after your procedure. Your health care provider may also give you more specific instructions. If you have problems or questions, contact your health care provider.  What can I expect after the procedure?  After your procedure, it is common to have some pain at the site where the needle was inserted (puncture site).  Follow these instructions at home:    Care of the puncture site  · Follow instructions from your health care  provider about how to take care of your puncture site. Make sure you:  ? Wash your hands with soap and water before you change your bandage (dressing). If soap and water are not available, use hand .  ? Change your dressing as told by your health care provider.  · Check the puncture site every day for signs of infection. Check for:  ? Redness, swelling, or pain.  ? Fluid or blood.  ? Warmth.  ? Pus or a bad smell.  · Do not take baths, swim, or use a hot tub until your health care provider approves.  General instructions  · Take over-the-counter and prescription medicines only as told by your health care provider.  · Do not drive for 24 hours if you were given a medicine to help you relax (sedative) during your procedure.  · Drink enough fluid to keep your urine pale yellow.  · You may return to your normal diet and normal activities as told by your health care provider.  · Keep all follow-up visits as told by your health care provider. This is important.  Contact a health care provider if you:  · Have redness, swelling, or pain at your puncture site.  · Have fluid or blood coming from your puncture site.  · Notice that your puncture site feels warm to the touch.  · Have pus or a bad smell coming from your puncture site.  · Have a fever.  · Have chills.  · Have nausea or vomiting.  · Have trouble breathing.  · Develop a worsening cough.  Get help right away if you:  · Have extreme shortness of breath.  · Develop chest pain.  · Faint or feel light-headed.  Summary  · After your procedure, it is common to have some pain at the site where the needle was inserted (puncture site).  · Wash your hands with soap and water before you change your bandage (dressing).  · Check your puncture site every day for signs of infection.  · Take over-the-counter and prescription medicines only as told by your health care provider.  This information is not intended to replace advice given to you by your health care provider.  Make sure you discuss any questions you have with your health care provider.  Document Released: 01/08/2016 Document Revised: 11/30/2018 Document Reviewed: 11/12/2018  Elsevier Patient Education © 2020 [employer]      Depression / Suicide Risk    As you are discharged from this [facility] facility, it is important to learn how to keep safe from harming yourself.    Recognize the warning signs:  · Abrupt changes in personality, positive or negative- including increase in energy   · Giving away possessions  · Change in eating patterns- significant weight changes-  positive or negative  · Change in sleeping patterns- unable to sleep or sleeping all the time   · Unwillingness or inability to communicate  · Depression  · Unusual sadness, discouragement and loneliness  · Talk of wanting to die  · Neglect of personal appearance   · Rebelliousness- reckless behavior  · Withdrawal from people/activities they love  · Confusion- inability to concentrate     If you or a loved one observes any of these behaviors or has concerns about self-harm, here's what you can do:  · Talk about it- your feelings and reasons for harming yourself  · Remove any means that you might use to hurt yourself (examples: pills, rope, extension cords, firearm)  · Get professional help from the community (Mental Health, Substance Abuse, psychological counseling)  · Do not be alone:Call your Safe Contact- someone whom you trust who will be there for you.  · Call your local CRISIS HOTLINE 415-4130 or 000-737-1319  · Call your local Children's Mobile Crisis Response Team [place] (286) 682-7613 or www.[employer]  · Call the toll free National Suicide Prevention Hotlines   · National Suicide Prevention Lifeline 404-098-JNYL (0129)  · National Hope Line Network 800-SUICIDE (502-6817)

## 2022-04-22 NOTE — ED TRIAGE NOTES
"67 yr old female to triage  Chief Complaint   Patient presents with   • Shortness of Breath   • Shoulder Pain     Patient report of shortness of breath for approximately 1.5 week progressively getting worst. Right shoulder and right upper back pain for the past 1 month.  Patient had blood drawn and her D-Dimer was elevated, she was sent for further evaluation for possible PE study.      /102   Pulse (!) 106   Temp 37 °C (98.6 °F) (Temporal)   Resp 18   Ht 1.727 m (5' 8\")   Wt 82.1 kg (181 lb)   SpO2 93%   BMI 27.52 kg/m²     Has this patient been vaccinated for COVID Yes  If not, would they like to be vaccinated while in the ER if eligible?  No   Would the patient like to speak with the ERP about the possibility of receiving the COVID vaccine today before making a decision? No       "

## 2022-04-22 NOTE — PROGRESS NOTES
4 Eyes Skin Assessment Completed by DERICK Norris and ___, RN.    Head WDL  Ears WDL  Nose WDL  Mouth WDL  Neck WDL  Breast/Chest Scar  Shoulder Blades WDL  Spine WDL  (R) Arm/Elbow/Hand WDL  (L) Arm/Elbow/Hand WDL  Abdomen WDL  Groin WDL  Scrotum/Coccyx/Buttocks WDL  (R) Leg WDL  (L) Leg WDL  (R) Heel/Foot/Toe WDL  (L) Heel/Foot/Toe WDL          Devices In Places none      Interventions In Place Pressure Redistribution Mattress    Possible Skin Injury No    Pictures Uploaded Into Epic N/A  Wound Consult Placed N/A  RN Wound Prevention Protocol Ordered No

## 2022-04-22 NOTE — ASSESSMENT & PLAN NOTE
- Likely due to bilateral pleural effusions  -This is mild, she was satting 89% on room air  -Obtain thoracentesis and start Lasix to pull some of the left-sided effusion as well as ascites  -Monitor oxygen and wean as able

## 2022-04-22 NOTE — H&P
Hospital Medicine History & Physical Note    Date of Service  4/21/2022    Primary Care Physician  Pcp Pt States None    Consultants  None    Specialist Names: None    Code Status  Full Code    Chief Complaint  Chief Complaint   Patient presents with   • Shortness of Breath   • Shoulder Pain     Patient report of shortness of breath for approximately 1.5 week progressively getting worst. Right shoulder and right upper back pain for the past 1 month.  Patient had blood drawn and her D-Dimer was elevated, she was sent for further evaluation for possible PE study.        History of Presenting Illness  Dominga Anthony is a 67 y.o. female who presented 4/21/2022 with shortness of breath.  Patient is from [place], here to help her daughter move to [place].  She states she began having right back pain near her scapula multiple weeks ago while she was still in [place].  She did see her primary care provider.  Because she was also having indigestion, they thought maybe it was her gallbladder.  They were in the process of working it up when she had to go out of town, did not have an ultrasound yet.  She states this pain is intermittent, no provoking or alleviating factors, sharp in nature and severe at times.  Today when it happened she took half of an edible and put a heat pad on and was able to sleep.  She states her indigestion is generalized, frequent belching.  Initially when she got here she did have constipation but this has resolved.  She went to urgent care on Tuesday and they obtained a chest x-ray as well as a D-dimer.  She was called today because the D-dimer was elevated and they were unable to get a CT scan until Monday, the physician at urgent care did not want her to wait and recommended she come to the emergency department.  She does have a history of breast cancer, no history of clotting.  While in the ER, she was satting 89% on room air, she generally does not wear oxygen.  I did discuss the case including  labs and imaging with the ER physician.    I discussed the plan of care with patient and family.    Review of Systems  Review of Systems   Constitutional: Negative for chills, fever and malaise/fatigue.   HENT: Negative for congestion.    Respiratory: Positive for cough and shortness of breath. Negative for sputum production and stridor.    Cardiovascular: Negative for chest pain, palpitations and leg swelling.   Gastrointestinal: Negative for abdominal pain, constipation, diarrhea, nausea and vomiting.   Genitourinary: Negative for dysuria and urgency.   Musculoskeletal: Positive for back pain. Negative for falls and myalgias.   Neurological: Negative for dizziness, tingling, loss of consciousness, weakness and headaches.   Psychiatric/Behavioral: Negative for depression and suicidal ideas.   All other systems reviewed and are negative.      Past Medical History   has a past medical history of Breast cancer (HCC) and Cancer (HCC).    Surgical History   has a past surgical history that includes mastectomy; ovarian cystectomy (Right); appendectomy; and tonsillectomy and adenoidectomy.     Family History  family history is not on file.   Family history reviewed with patient. There is no family history that is pertinent to the chief complaint.     Social History   reports that she quit smoking about 8 years ago. She has never used smokeless tobacco. She reports current alcohol use. She reports current drug use. Drugs: Oral and Inhaled.    Allergies  No Known Allergies    Medications  Prior to Admission Medications   Prescriptions Last Dose Informant Patient Reported? Taking?   meloxicam (MOBIC) 7.5 MG Tab several months  Yes No   Sig: meloxicam 7.5 mg tablet   omeprazole (PRILOSEC) 20 MG delayed-release capsule 4/21/2022 at 0730  Yes No   Sig: Take 20 mg by mouth every day.      Facility-Administered Medications: None       Physical Exam  Temp:  [37 °C (98.6 °F)] 37 °C (98.6 °F)  Pulse:  [] 92  Resp:  [16-18]  16  BP: (129-148)/() 129/83  SpO2:  [89 %-98 %] 98 %  Blood Pressure : 129/83   Temperature: 37 °C (98.6 °F)   Pulse: 92   Respiration: 16   Pulse Oximetry: 98 %       Physical Exam  Vitals and nursing note reviewed.   Constitutional:       General: She is not in acute distress.     Appearance: She is well-developed. She is not diaphoretic.   HENT:      Head: Normocephalic and atraumatic.      Right Ear: External ear normal.      Left Ear: External ear normal.      Nose: Nose normal. No congestion or rhinorrhea.      Mouth/Throat:      Mouth: Mucous membranes are moist.      Pharynx: No oropharyngeal exudate.   Eyes:      General:         Right eye: No discharge.         Left eye: No discharge.      Extraocular Movements: Extraocular movements intact.   Neck:      Trachea: No tracheal deviation.   Cardiovascular:      Rate and Rhythm: Normal rate and regular rhythm.      Heart sounds: No murmur heard.    No friction rub. No gallop.   Pulmonary:      Effort: Pulmonary effort is normal. No respiratory distress.      Breath sounds: No stridor. Examination of the right-middle field reveals decreased breath sounds. Examination of the right-lower field reveals decreased breath sounds. Decreased breath sounds present. No wheezing or rales.   Chest:      Chest wall: No tenderness.   Abdominal:      General: Bowel sounds are normal. There is no distension.      Palpations: Abdomen is soft.      Tenderness: There is no abdominal tenderness.   Musculoskeletal:         General: No tenderness. Normal range of motion.      Cervical back: Normal range of motion and neck supple.      Right lower leg: No edema.      Left lower leg: No edema.   Lymphadenopathy:      Cervical: No cervical adenopathy.   Skin:     General: Skin is warm and dry.      Findings: No erythema or rash.   Neurological:      General: No focal deficit present.      Mental Status: She is alert and oriented to person, place, and time.      Cranial Nerves: No  cranial nerve deficit.   Psychiatric:         Mood and Affect: Mood normal.         Behavior: Behavior normal.         Thought Content: Thought content normal.         Judgment: Judgment normal.         Laboratory:  Recent Labs     04/19/22 1153 04/21/22 2025   WBC 5.3 5.9   RBC 4.77 5.00   HEMOGLOBIN 15.7 16.2*   HEMATOCRIT 48.9* 49.3*   .5* 98.6*   MCH 32.9 32.4   MCHC 32.1* 32.9*   RDW 53.2* 49.1   PLATELETCT 334 322   MPV 10.4 9.6     Recent Labs     04/19/22 1153 04/21/22 2025   SODIUM 139 136   POTASSIUM 4.0 3.7   CHLORIDE 101 98   CO2 26 26   GLUCOSE 79 98   BUN 14 11   CREATININE 0.49* 0.53   CALCIUM 9.5 9.6     Recent Labs     04/19/22 1153 04/21/22 2025   ALTSGPT 46 40   ASTSGOT 58* 53*   ALKPHOSPHAT 128* 143*   TBILIRUBIN 0.4 0.5   GLUCOSE 79 98         No results for input(s): NTPROBNP in the last 72 hours.      Recent Labs     04/21/22 2025   TROPONINT 6       Imaging:  CT-CTA CHEST PULMONARY ARTERY W/ RECONS   Final Result         1.  The pulmonary arteries are suboptimally opacified limiting evaluation, no large central pulmonary embolus is appreciated. Repeat imaging would be required for definitive exclusion of pulmonary embolism of the remaining pulmonary arteries.   2.  Moderate right and small left pleural effusions   3.  Low-density lesion in the left hepatic lobe laterally, recommend follow-up 3 phase CT liver for further characterization.   4.  Linear densities the bilateral lung bases favor changes of atelectasis.   5.  Atherosclerosis and atherosclerotic coronary artery disease.   6.  Scattered upper abdominal ascites   7.  Scattered sclerotic bony lesions, appearance is typical of metastatic bone disease.      EC-ECHOCARDIOGRAM COMPLETE W/O CONT    (Results Pending)   IR-THORACENTESIS PUNCTURE RIGHT    (Results Pending)       EKG:  I have personally reviewed the images and compared with prior images.    Assessment/Plan:  I anticipate this patient is appropriate for observation  status at this time.    * Bilateral pleural effusion- (present on admission)  Assessment & Plan  - Right greater than left, I have ordered a thoracentesis on the right  -We will also start Lasix  -I do not think this is heart failure however I will obtain an echocardiogram so this is ruled out  -Patient wants her work-up primarily to be done when she gets back home, she just wants to feel better now and be safe to finish her vacation and go home next week  -I told her for now, we will do a thoracentesis and an echocardiogram, she should go home with her medical records and take them to her primary care provider  -I did express to her that with the liver lesion as well as the sclerotic bony lesions and history of breast cancer, I was concerned that she had cancer and does need follow-up, she and her daughter understood, I told her she should make an appointment with her primary care provider soon even prior to arriving home    Liver lesion- (present on admission)  Assessment & Plan  - Patient will need a three-phase CT of the liver however she does want her work-up to be done in [place], she did just have contrast on hesitant to give her more contrast especially if a repeat CTA of the chest is needed  -I am concerned she has metastatic cancer, I am unsure if this is the primary or with her history of breast cancer if that is the primary however there was no breast mass noted on CT scan    Back pain- (present on admission)  Assessment & Plan  - This is near the right scapula, I did discuss this with the radiologist who states there is significant sclerotic bony lesion in this area, this is likely causing the pain  -Since it is intermittent in nature, potentially muscle spasm  -Start multiple pain meds to determine which will decrease her pain  -I do not think this is due to a pulmonary embolism, no worsening with a deep breath however she is at risk for a PE, if there is no improvement in her oxygenation after  thoracentesis repeat CTA could be considered to get more distal vessels or full dose anticoagulation could be considered    Hypoxia- (present on admission)  Assessment & Plan  - Likely due to bilateral pleural effusions  -This is mild, she was satting 89% on room air  -Obtain thoracentesis and start Lasix to pull some of the left-sided effusion as well as ascites  -Monitor oxygen and wean as able    Elevated d-dimer- (present on admission)  Assessment & Plan  - I think this is likely due to cancer, I do not think she has a PE  -Certainly no central PE on CT of the chest however more distal vessels were not adequately seen  -I am not going to start full dose anticoagulation at this time, SCDs only since she will need a thoracentesis  -If no improvement in her oxygenation postthoracentesis on Lasix, could consider repeat of CTA of the chest however I think this is less likely      VTE prophylaxis: SCDs/TEDs

## 2022-04-22 NOTE — PROGRESS NOTES
Spoke with the patient on the phone this evening.  She never received our calls or voicemails that we left her yesterday.  I placed a CTA order for her to have preformed yesterday at 5:15 PM however she states she has been having difficulties with her phone and did not receive the message.  States she is still experiencing shortness of breath.  This has not worsened since her initial visit.  Continues to have pain that radiates up to the right shoulder.  Due to her elevated D-dimer and current symptoms I believe CTA is necessary to rule out potential clot.  Her outpatient imaging is closed right now I am unable to schedule with the patient this evening.  She is going to present to [facility] ED for further work-up and imaging at this time.

## 2022-04-22 NOTE — ED NOTES
Pt resting in gurney with no signs of distress. Updated pt on POC. Denies any needs at this time.

## 2022-04-22 NOTE — ED PROVIDER NOTES
ED Provider Note    CHIEF COMPLAINT  Chief Complaint   Patient presents with   • Shortness of Breath   • Shoulder Pain     Patient report of shortness of breath for approximately 1.5 week progressively getting worst. Right shoulder and right upper back pain for the past 1 month.  Patient had blood drawn and her D-Dimer was elevated, she was sent for further evaluation for possible PE study.        [facility]  Dominga Anthony is a 67 y.o. female who presents with concern for possible PE.  Patient is from [place] and reports while she was at home in [place] she for started getting some right sided back pain that she saw her primary care doctor for their and had multiple work-ups more thinking gallbladder or reflux.  She states all of her tests were normal just prior to coming here and flying to [place].  She states while she was in the airport she began to notice shortness of breath which has been progressively more pronounced in the 1.5 weeks that she has been here.  She states her symptoms have been getting worse.  She states she was seen at urgent care on Tuesday and had an elevated D-dimer (2.52 in chart) and was told she needed to come to the emergency department for evaluation and CT scan.  She denies any new leg pain or swelling.  She has a history of breast cancer in the past.  She denies any history of DVT or PE in the past.  Denies any cardiac history.  Denies any daily medications.    REVIEW OF SYSTEMS  See HPI for further details. All other systems are negative.     PAST MEDICAL HISTORY   has a past medical history of Breast cancer (HCC) and Cancer (HCC).    SOCIAL HISTORY  Social History     Tobacco Use   • Smoking status: Former Smoker     Quit date:      Years since quittin.3   • Smokeless tobacco: Never Used   Vaping Use   • Vaping Use: Not on file   Substance and Sexual Activity   • Alcohol use: Yes     Comment: socially    • Drug use: Yes     Types: Oral, Inhaled     Comment: edible    • Sexual activity:  "Not on file       SURGICAL HISTORY   has a past surgical history that includes mastectomy; ovarian cystectomy (Right); appendectomy; and tonsillectomy and adenoidectomy.    CURRENT MEDICATIONS  Home Medications     Reviewed by Lena Weinberg R.N. (Registered Nurse) on 04/21/22 at 1947  Med List Status: Complete   Medication Last Dose Status   meloxicam (MOBIC) 7.5 MG Tab several months Active   omeprazole (PRILOSEC) 20 MG delayed-release capsule 4/21/2022 Active                ALLERGIES  No Known Allergies    PHYSICAL EXAM  VITAL SIGNS: /83   Pulse 92   Temp 37 °C (98.6 °F) (Temporal)   Resp 16   Ht 1.727 m (5' 8\")   Wt 82.1 kg (181 lb)   SpO2 98%   BMI 27.52 kg/m²     Pulse ox interpretation: I interpret this pulse ox as normal.  Constitutional: Alert in no apparent distress.  Pleasant elderly female appears stated age  HENT: No signs of trauma, Bilateral external ears normal, Nose normal.   Eyes: Pupils are equal and reactive, Conjunctiva normal, Non-icteric.   Neck: Normal range of motion, No tenderness, Supple, No stridor.   Cardiovascular: Regular rate and rhythm, no murmurs.   Thorax & Lungs: Normal breath sounds, No respiratory distress, No wheezing, No chest tenderness.   Abdomen: Soft, No tenderness, No masses, No pulsatile masses. No peritoneal signs.  Skin: Warm, Dry, No erythema, No rash.   Extremities: Intact distal pulses, No edema, No tenderness, No cyanosis.  Musculoskeletal: Good range of motion in all major joints. No major deformities noted.   Neurologic: Alert , Normal motor function, Normal speech function, No focal deficits noted.   Psychiatric: Affect normal, Judgment normal, Mood normal.       DIAGNOSTIC STUDIES / PROCEDURES    LABS  Results for orders placed or performed during the hospital encounter of 04/21/22   CBC with Differential   Result Value Ref Range    WBC 5.9 4.8 - 10.8 K/uL    RBC 5.00 4.20 - 5.40 M/uL    Hemoglobin 16.2 (H) 12.0 - 16.0 g/dL    Hematocrit 49.3 (H) " 37.0 - 47.0 %    MCV 98.6 (H) 81.4 - 97.8 fL    MCH 32.4 27.0 - 33.0 pg    MCHC 32.9 (L) 33.6 - 35.0 g/dL    RDW 49.1 35.9 - 50.0 fL    Platelet Count 322 164 - 446 K/uL    MPV 9.6 9.0 - 12.9 fL    Neutrophils-Polys 67.80 44.00 - 72.00 %    Lymphocytes 20.90 (L) 22.00 - 41.00 %    Monocytes 9.30 0.00 - 13.40 %    Eosinophils 1.00 0.00 - 6.90 %    Basophils 0.50 0.00 - 1.80 %    Immature Granulocytes 0.50 0.00 - 0.90 %    Nucleated RBC 0.00 /100 WBC    Neutrophils (Absolute) 4.01 2.00 - 7.15 K/uL    Lymphs (Absolute) 1.24 1.00 - 4.80 K/uL    Monos (Absolute) 0.55 0.00 - 0.85 K/uL    Eos (Absolute) 0.06 0.00 - 0.51 K/uL    Baso (Absolute) 0.03 0.00 - 0.12 K/uL    Immature Granulocytes (abs) 0.03 0.00 - 0.11 K/uL    NRBC (Absolute) 0.00 K/uL   Complete Metabolic Panel (CMP)   Result Value Ref Range    Sodium 136 135 - 145 mmol/L    Potassium 3.7 3.6 - 5.5 mmol/L    Chloride 98 96 - 112 mmol/L    Co2 26 20 - 33 mmol/L    Anion Gap 12.0 7.0 - 16.0    Glucose 98 65 - 99 mg/dL    Bun 11 8 - 22 mg/dL    Creatinine 0.53 0.50 - 1.40 mg/dL    Calcium 9.6 8.4 - 10.2 mg/dL    AST(SGOT) 53 (H) 12 - 45 U/L    ALT(SGPT) 40 2 - 50 U/L    Alkaline Phosphatase 143 (H) 30 - 99 U/L    Total Bilirubin 0.5 0.1 - 1.5 mg/dL    Albumin 4.2 3.2 - 4.9 g/dL    Total Protein 7.8 6.0 - 8.2 g/dL    Globulin 3.6 (H) 1.9 - 3.5 g/dL    A-G Ratio 1.2 g/dL   Troponin   Result Value Ref Range    Troponin T 6 6 - 19 ng/L   ESTIMATED GFR   Result Value Ref Range    GFR (CKD-EPI) 101 >60 mL/min/1.73 m 2   EKG   Result Value Ref Range    Report       [facility] Emergency Dept.    Test Date:  2022  Pt Name:    ABUNDIO MARCANO               Department: [facility]  MRN:        [id number]                      Room:  Gender:     Female                       Technician: BENSON  :        1955                   Requested By:ER TRIAGE PROTOCOL  Order #:    [id number]                    Reading MD: Jessica Merida    Measurements  Intervals                                 Axis  Rate:       101                          P:          76  MA:         183                          QRS:        48  QRSD:       87                           T:          30  QT:         329  QTc:        427    Interpretive Statements  Sinus tachycardia rate of 101, normal axis, normal intervals, no ST  elevations,  depressions, or T wave inversions  Electronically Signed On 4- 21:05:51 PDT by Jessica Merida         RADIOLOGY  CT-CTA CHEST PULMONARY ARTERY W/ RECONS   Final Result         1.  The pulmonary arteries are suboptimally opacified limiting evaluation, no large central pulmonary embolus is appreciated. Repeat imaging would be required for definitive exclusion of pulmonary embolism of the remaining pulmonary arteries.   2.  Moderate right and small left pleural effusions   3.  Low-density lesion in the left hepatic lobe laterally, recommend follow-up 3 phase CT liver for further characterization.   4.  Linear densities the bilateral lung bases favor changes of atelectasis.   5.  Atherosclerosis and atherosclerotic coronary artery disease.   6.  Scattered upper abdominal ascites   7.  Scattered sclerotic bony lesions, appearance is typical of metastatic bone disease.      EC-ECHOCARDIOGRAM COMPLETE W/O CONT    (Results Pending)   IR-THORACENTESIS PUNCTURE RIGHT    (Results Pending)           COURSE & MEDICAL DECISION MAKING  Pertinent Labs & Imaging studies reviewed. (See chart for details)  11:09 PM  Accepted for admission by Dr. Eliseo Lopez 67-year-old female presenting with increasing dyspnea last 1 to 2 weeks after 1 month history of slowly increasing right-sided back pain.  Patient was found to be hypoxic in the emergency department down to the upper 80s.  She required 2 L nasal cannula to maintain sats greater than 90%.  She had initially been seen in urgent care 2 days prior with a positive D-dimer, however CTA showed no evidence of a large PE, but the  contrast was not timed appropriately to see the more distal.  She does however have a quite large right-sided pleural effusion and multiple bony sclerotic lesions seen on CT including lesions on the right where her pain is.  This most likely represents new metastatic cancer source and would certainly be a cause of pain.  Pleural effusion could certainly cause the desaturations and symptoms she is exhibiting.  Lower suspicion at this time for PE.  Patient will be admitted to hospitalist, will likely require thoracentesis though this does not need to be done emergently.  Plan will also be for echo while inpatient to evaluate cardiac function.  If she improves as expected after thoracentesis may be able to avoid second CT scan for PE assessment.  Her EKG is abnormal with no evidence of ischemia and troponin is normal.    FINAL IMPRESSION  1. Hypoxia     2. Dyspnea on exertion     3. Pleural effusion on right           Electronically signed by: Jessica Merida M.D., 4/21/2022 8:53 PM

## 2022-04-23 LAB
FUNGUS SPEC FUNGUS STN: NORMAL
GRAM STN SPEC: NORMAL
SIGNIFICANT IND 70042: NORMAL
SIGNIFICANT IND 70042: NORMAL
SITE SITE: NORMAL
SITE SITE: NORMAL
SOURCE SOURCE: NORMAL
SOURCE SOURCE: NORMAL

## 2022-04-24 LAB
AMYLASE FLD-CCNC: 36 U/L
BODY FLD TYPE: NORMAL

## 2022-04-25 LAB
BACTERIA FLD AEROBE CULT: NORMAL
GRAM STN SPEC: NORMAL
SIGNIFICANT IND 70042: NORMAL
SITE SITE: NORMAL
SOURCE SOURCE: NORMAL

## 2022-05-18 LAB
FUNGUS SPEC CULT: NORMAL
FUNGUS SPEC FUNGUS STN: NORMAL
SIGNIFICANT IND 70042: NORMAL
SITE SITE: NORMAL
SOURCE SOURCE: NORMAL

## 2022-06-18 LAB
FINAL REPORT Q0603: NORMAL
PRELIMINARY RPT Q0601: NORMAL
RHODAMINE-AURAMINE STN SPEC: NORMAL